# Patient Record
Sex: FEMALE | Race: WHITE | NOT HISPANIC OR LATINO | URBAN - NONMETROPOLITAN AREA
[De-identification: names, ages, dates, MRNs, and addresses within clinical notes are randomized per-mention and may not be internally consistent; named-entity substitution may affect disease eponyms.]

---

## 2017-01-20 ENCOUNTER — NEW PATIENT (OUTPATIENT)
Dept: URBAN - NONMETROPOLITAN AREA CLINIC 6 | Facility: CLINIC | Age: 42
End: 2017-01-20
Payer: COMMERCIAL

## 2017-01-20 DIAGNOSIS — H04.123 TEAR FILM INSUFFICIENCY OF BILATERAL LACRIMAL GLANDS: ICD-10-CM

## 2017-01-20 DIAGNOSIS — H52.01 HYPERMETROPIA, RIGHT EYE: Primary | ICD-10-CM

## 2017-01-20 PROCEDURE — 92015 DETERMINE REFRACTIVE STATE: CPT | Performed by: OPTOMETRIST

## 2017-01-20 PROCEDURE — 92004 COMPRE OPH EXAM NEW PT 1/>: CPT | Performed by: OPTOMETRIST

## 2017-01-20 ASSESSMENT — VISUAL ACUITY
OD: 20/20
OS: 20/20

## 2017-01-20 ASSESSMENT — INTRAOCULAR PRESSURE
OS: 16
OD: 17

## 2017-12-16 ENCOUNTER — CONVERSION ENCOUNTER (OUTPATIENT)
Dept: FAMILY MEDICINE CLINIC | Facility: CLINIC | Age: 42
End: 2017-12-16

## 2017-12-16 LAB
ALBUMIN SERPL-MCNC: 4.4 G/DL (ref 3.6–5.1)
ALBUMIN/GLOB SERPL: ABNORMAL {RATIO} (ref 1–2.5)
ALP SERPL-CCNC: 102 UNITS/L (ref 33–115)
ALT SERPL-CCNC: 20 UNITS/L (ref 6–29)
AST SERPL-CCNC: 16 UNITS/L (ref 10–30)
BASOPHILS # BLD AUTO: ABNORMAL 10*3/MM3 (ref 0–200)
BASOPHILS NFR BLD AUTO: 0.3 %
BILIRUB SERPL-MCNC: 0.4 MG/DL (ref 0.2–1.2)
BUN SERPL-MCNC: 12 MG/DL (ref 7–25)
BUN/CREAT SERPL: ABNORMAL (ref 6–22)
CALCIUM SERPL-MCNC: 9.7 MG/DL (ref 8.6–10.2)
CHLORIDE SERPL-SCNC: 108 MMOL/L (ref 98–110)
CHOLEST SERPL-MCNC: 188 MG/DL
CHOLEST/HDLC SERPL: ABNORMAL {RATIO}
CO2 CONTENT VENOUS: 25 MMOL/L (ref 20–31)
CONV NEUTROPHILS/100 LEUKOCYTES IN BODY FLUID BY MANUAL COUNT: 69 %
CONV TOTAL PROTEIN: 6.9 G/DL (ref 6.1–8.1)
CREAT UR-MCNC: 0.81 MG/DL (ref 0.5–1.1)
EOSINOPHIL # BLD AUTO: 1 %
EOSINOPHIL # BLD AUTO: ABNORMAL 10*3/MM3 (ref 15–500)
ERYTHROCYTE [DISTWIDTH] IN BLOOD BY AUTOMATED COUNT: 12.1 % (ref 11–15)
GLOBULIN UR ELPH-MCNC: ABNORMAL G/DL (ref 1.9–3.7)
GLUCOSE SERPL-MCNC: 87 MG/DL (ref 65–99)
HCT VFR BLD AUTO: 42.2 % (ref 35–45)
HDLC SERPL-MCNC: 36 MG/DL
HGB BLD-MCNC: 13.8 G/DL (ref 11.7–15.5)
LDLC SERPL CALC-MCNC: ABNORMAL MG/DL
LYMPHOCYTES # BLD AUTO: ABNORMAL 10*3/MM3 (ref 850–3900)
LYMPHOCYTES NFR BLD AUTO: 21.5 %
MCH RBC QN AUTO: 29.2 PG (ref 27–33)
MCHC RBC AUTO-ENTMCNC: ABNORMAL % (ref 32–36)
MCV RBC AUTO: 89.4 FL (ref 80–100)
MONOCYTES # BLD AUTO: ABNORMAL 10*3/MICROLITER (ref 200–950)
MONOCYTES NFR BLD AUTO: 8.2 %
NEUTROPHILS # BLD AUTO: ABNORMAL 10*3/MM3 (ref 1500–7800)
PLATELET # BLD AUTO: ABNORMAL 10*3/MM3 (ref 140–400)
PMV BLD AUTO: 11.4 FL (ref 7.5–12.5)
POTASSIUM SERPL-SCNC: 4.7 MMOL/L (ref 3.5–5.3)
RBC # BLD AUTO: ABNORMAL 10*6/MM3 (ref 3.8–5.1)
SODIUM SERPL-SCNC: 139 MMOL/L (ref 135–146)
T4 FREE SERPL-MCNC: 0.8 NG/DL (ref 0.8–1.8)
TRIGL SERPL-MCNC: 132 MG/DL
TSH SERPL-ACNC: 5.56 MICROINTL UNITS/ML
VIT B12 SERPL-MCNC: 1611 PG/ML (ref 200–1100)
WBC # BLD AUTO: ABNORMAL K/UL (ref 3.8–10.8)

## 2018-03-08 LAB
CONV NEUTROPHILS/100 LEUKOCYTES IN BODY FLUID BY MANUAL COUNT: 72 %
EOSINOPHIL # BLD AUTO: 1 %
EOSINOPHIL # BLD AUTO: ABNORMAL 10*3/MM3 (ref 15–500)
ERYTHROCYTE [DISTWIDTH] IN BLOOD BY AUTOMATED COUNT: 12.4 % (ref 11–15)
HCT VFR BLD AUTO: 41.1 % (ref 35–45)
HGB BLD-MCNC: 14 G/DL (ref 11.7–15.5)
LYMPHOCYTES # BLD AUTO: ABNORMAL 10*3/MM3 (ref 850–3900)
LYMPHOCYTES NFR BLD AUTO: 18 %
MCH RBC QN AUTO: 30.7 PG (ref 27–33)
MCHC RBC AUTO-ENTMCNC: ABNORMAL % (ref 32–36)
MCV RBC AUTO: 90.2 FL (ref 80–100)
MONOCYTES # BLD AUTO: ABNORMAL 10*3/MICROLITER (ref 200–950)
MONOCYTES NFR BLD AUTO: 8 %
NEUTROPHILS # BLD AUTO: ABNORMAL 10*3/MM3 (ref 1500–7800)
PLATELET # BLD AUTO: ABNORMAL 10*3/MM3 (ref 140–400)
PMV BLD AUTO: 10.1 FL (ref 7.5–11.5)
RBC # BLD AUTO: ABNORMAL 10*6/MM3 (ref 3.8–5.1)
TSH SERPL-ACNC: 0.09 MICROINTL UNITS/ML
WBC # BLD AUTO: ABNORMAL 10*3/MM3 (ref 3.8–10.8)

## 2018-08-24 LAB
ALBUMIN SERPL-MCNC: 4.4 G/DL (ref 3.6–5.1)
ALBUMIN/GLOB SERPL: ABNORMAL {RATIO} (ref 1–2.5)
ALP SERPL-CCNC: 93 UNITS/L (ref 33–115)
ALT SERPL-CCNC: 31 UNITS/L (ref 6–29)
AST SERPL-CCNC: 26 UNITS/L (ref 10–30)
BILIRUB SERPL-MCNC: 0.4 MG/DL (ref 0.2–1.2)
BUN SERPL-MCNC: 10 MG/DL (ref 7–25)
BUN/CREAT SERPL: ABNORMAL (ref 6–22)
CALCIUM SERPL-MCNC: 9.4 MG/DL (ref 8.6–10.2)
CHLORIDE SERPL-SCNC: 106 MMOL/L (ref 98–110)
CHOLEST SERPL-MCNC: 201 MG/DL
CHOLEST/HDLC SERPL: ABNORMAL {RATIO}
CO2 CONTENT VENOUS: 25 MMOL/L (ref 20–32)
CONV TOTAL PROTEIN: 7 G/DL (ref 6.1–8.1)
CREAT UR-MCNC: 0.77 MG/DL (ref 0.5–1.1)
GLOBULIN UR ELPH-MCNC: ABNORMAL G/DL (ref 1.9–3.7)
GLUCOSE SERPL-MCNC: 84 MG/DL (ref 65–139)
HDLC SERPL-MCNC: 31 MG/DL
LDLC SERPL CALC-MCNC: ABNORMAL MG/DL
POTASSIUM SERPL-SCNC: 4.5 MMOL/L (ref 3.5–5.3)
SODIUM SERPL-SCNC: 140 MMOL/L (ref 135–146)
TRIGL SERPL-MCNC: 459 MG/DL
TSH SERPL-ACNC: 1.09 MICROINTL UNITS/ML

## 2020-09-02 ENCOUNTER — NEW PATIENT (OUTPATIENT)
Dept: URBAN - NONMETROPOLITAN AREA CLINIC 6 | Facility: CLINIC | Age: 45
End: 2020-09-02
Payer: COMMERCIAL

## 2020-09-02 DIAGNOSIS — H52.13 MYOPIA, BILATERAL: Primary | ICD-10-CM

## 2020-09-02 PROCEDURE — 92015 DETERMINE REFRACTIVE STATE: CPT | Performed by: OPTOMETRIST

## 2020-09-02 PROCEDURE — 92004 COMPRE OPH EXAM NEW PT 1/>: CPT | Performed by: OPTOMETRIST

## 2020-09-02 ASSESSMENT — VISUAL ACUITY
OD: 20/20
OS: 20/20

## 2020-09-02 ASSESSMENT — INTRAOCULAR PRESSURE
OD: 16
OS: 16

## 2022-08-02 ENCOUNTER — OFFICE VISIT (OUTPATIENT)
Dept: ENDOCRINOLOGY | Facility: CLINIC | Age: 47
End: 2022-08-02

## 2022-08-02 VITALS
SYSTOLIC BLOOD PRESSURE: 122 MMHG | HEART RATE: 89 BPM | OXYGEN SATURATION: 98 % | HEIGHT: 63 IN | BODY MASS INDEX: 41.11 KG/M2 | WEIGHT: 232 LBS | DIASTOLIC BLOOD PRESSURE: 78 MMHG

## 2022-08-02 DIAGNOSIS — E78.2 MIXED HYPERLIPIDEMIA: ICD-10-CM

## 2022-08-02 DIAGNOSIS — E06.3 HYPOTHYROIDISM DUE TO HASHIMOTO'S THYROIDITIS: ICD-10-CM

## 2022-08-02 DIAGNOSIS — E55.9 VITAMIN D DEFICIENCY: ICD-10-CM

## 2022-08-02 DIAGNOSIS — E03.8 HYPOTHYROIDISM DUE TO HASHIMOTO'S THYROIDITIS: ICD-10-CM

## 2022-08-02 DIAGNOSIS — E05.80 HYPERTHYROIDISM WITH HASHIMOTO DISEASE: Primary | ICD-10-CM

## 2022-08-02 DIAGNOSIS — E06.3 HYPERTHYROIDISM WITH HASHIMOTO DISEASE: Primary | ICD-10-CM

## 2022-08-02 PROBLEM — K21.00 GASTRO-ESOPHAGEAL REFLUX DISEASE WITH ESOPHAGITIS: Status: ACTIVE | Noted: 2021-12-13

## 2022-08-02 PROBLEM — M19.90 ARTHRITIS: Status: ACTIVE | Noted: 2022-08-02

## 2022-08-02 PROBLEM — J30.2 SEASONAL ALLERGIES: Status: ACTIVE | Noted: 2017-12-15

## 2022-08-02 PROBLEM — R51.9 ACUTE HEADACHE: Status: ACTIVE | Noted: 2021-12-22

## 2022-08-02 PROBLEM — F41.9 ANXIETY DISORDER: Status: ACTIVE | Noted: 2019-05-29

## 2022-08-02 PROBLEM — K58.9 IRRITABLE BOWEL SYNDROME: Status: ACTIVE | Noted: 2022-08-02

## 2022-08-02 PROBLEM — E78.5 HYPERLIPIDEMIA: Status: ACTIVE | Noted: 2022-08-02

## 2022-08-02 PROBLEM — R35.0 INCREASED FREQUENCY OF URINATION: Status: ACTIVE | Noted: 2018-03-08

## 2022-08-02 PROBLEM — E53.8 DEFICIENCY OF OTHER SPECIFIED B GROUP VITAMINS: Status: ACTIVE | Noted: 2022-08-02

## 2022-08-02 PROBLEM — U07.1 COVID-19: Status: ACTIVE | Noted: 2021-12-22

## 2022-08-02 PROBLEM — K58.9 IRRITABLE BOWEL SYNDROME: Status: ACTIVE | Noted: 2019-05-29

## 2022-08-02 PROBLEM — N95.1 MENOPAUSAL SYMPTOM: Status: ACTIVE | Noted: 2019-10-02

## 2022-08-02 PROBLEM — E66.9 OBESITY: Status: ACTIVE | Noted: 2019-05-29

## 2022-08-02 PROBLEM — J01.00 SINUSITIS, ACUTE MAXILLARY: Status: ACTIVE | Noted: 2017-12-15

## 2022-08-02 PROBLEM — J35.1 TONSILLAR ENLARGEMENT: Status: ACTIVE | Noted: 2018-03-08

## 2022-08-02 PROBLEM — B34.9 VIRAL DISEASE: Status: ACTIVE | Noted: 2021-12-22

## 2022-08-02 PROBLEM — J45.909 ASTHMA: Status: ACTIVE | Noted: 2022-08-02

## 2022-08-02 PROBLEM — F32.A DEPRESSION: Status: ACTIVE | Noted: 2022-08-02

## 2022-08-02 PROBLEM — F52.0 LACK OR LOSS OF SEXUAL DESIRE: Status: ACTIVE | Noted: 2019-09-11

## 2022-08-02 PROCEDURE — 99244 OFF/OP CNSLTJ NEW/EST MOD 40: CPT | Performed by: INTERNAL MEDICINE

## 2022-08-02 RX ORDER — DIPHENOXYLATE HYDROCHLORIDE AND ATROPINE SULFATE 2.5; .025 MG/1; MG/1
TABLET ORAL
COMMUNITY
End: 2022-08-02

## 2022-08-02 RX ORDER — VITAMIN E 268 MG
CAPSULE ORAL EVERY 12 HOURS SCHEDULED
COMMUNITY

## 2022-08-02 RX ORDER — ASPIRIN 81 MG/1
TABLET ORAL
COMMUNITY
End: 2022-08-02

## 2022-08-02 RX ORDER — BREMELANOTIDE 1.75 MG/.3ML
1.75 INJECTION SUBCUTANEOUS EVERY 24 HOURS
COMMUNITY
End: 2022-08-02

## 2022-08-02 RX ORDER — AMITRIPTYLINE HYDROCHLORIDE 10 MG/1
10 TABLET, FILM COATED ORAL
COMMUNITY
Start: 2022-05-28

## 2022-08-02 RX ORDER — PANTOPRAZOLE SODIUM 40 MG/1
40 TABLET, DELAYED RELEASE ORAL 2 TIMES DAILY
COMMUNITY
Start: 2022-07-09

## 2022-08-02 RX ORDER — ASPIRIN 81 MG/1
81 TABLET ORAL DAILY
Qty: 150 TABLET | Refills: 2 | Status: SHIPPED | OUTPATIENT
Start: 2022-08-02 | End: 2023-08-02

## 2022-08-02 RX ORDER — ALBUTEROL SULFATE 90 UG/1
2 AEROSOL, METERED RESPIRATORY (INHALATION) EVERY 4 HOURS PRN
COMMUNITY

## 2022-08-02 RX ORDER — LEVOCETIRIZINE DIHYDROCHLORIDE 5 MG/1
TABLET, FILM COATED ORAL
COMMUNITY

## 2022-08-02 RX ORDER — GABAPENTIN 100 MG/1
CAPSULE ORAL EVERY 8 HOURS SCHEDULED
COMMUNITY
End: 2022-08-02

## 2022-08-02 RX ORDER — SUMATRIPTAN 100 MG/1
TABLET, FILM COATED ORAL
COMMUNITY
End: 2022-08-02

## 2022-08-02 RX ORDER — LEVOTHYROXINE SODIUM 137 UG/1
68.5 TABLET ORAL DAILY
COMMUNITY
Start: 2022-06-06 | End: 2022-08-31 | Stop reason: SDUPTHER

## 2022-08-02 RX ORDER — BEPOTASTINE BESILATE 15 MG/ML
SOLUTION/ DROPS OPHTHALMIC EVERY 12 HOURS SCHEDULED
COMMUNITY

## 2022-08-02 RX ORDER — VORTIOXETINE 10 MG/1
1 TABLET, FILM COATED ORAL DAILY
COMMUNITY
Start: 2022-07-18

## 2022-08-02 RX ORDER — FENOFIBRATE 160 MG/1
160 TABLET ORAL DAILY
COMMUNITY
Start: 2022-05-28

## 2022-08-02 RX ORDER — FLUTICASONE PROPIONATE 50 MCG
SPRAY, SUSPENSION (ML) NASAL
COMMUNITY

## 2022-08-02 RX ORDER — ESCITALOPRAM OXALATE 10 MG/1
TABLET ORAL
COMMUNITY
End: 2022-08-02

## 2022-08-02 RX ORDER — METHOCARBAMOL 500 MG/1
TABLET, FILM COATED ORAL
COMMUNITY
Start: 2022-07-09 | End: 2022-08-02

## 2022-08-02 RX ORDER — FEXOFENADINE HCL AND PSEUDOEPHEDRINE HCI 180; 240 MG/1; MG/1
TABLET, EXTENDED RELEASE ORAL
COMMUNITY

## 2022-08-02 RX ORDER — RIMEGEPANT SULFATE 75 MG/75MG
TABLET, ORALLY DISINTEGRATING ORAL
COMMUNITY
Start: 2022-07-20

## 2022-08-02 RX ORDER — SUCRALFATE 1 G/1
1 TABLET ORAL 4 TIMES DAILY
COMMUNITY
Start: 2022-05-31

## 2022-08-02 RX ORDER — DULOXETIN HYDROCHLORIDE 30 MG/1
30 CAPSULE, DELAYED RELEASE ORAL 2 TIMES DAILY
COMMUNITY
Start: 2022-07-18

## 2022-08-02 RX ORDER — BUPROPION HYDROCHLORIDE 150 MG/1
TABLET ORAL EVERY 24 HOURS
COMMUNITY
End: 2022-08-02

## 2022-08-02 RX ORDER — ACETAMINOPHEN 160 MG
2000 TABLET,DISINTEGRATING ORAL DAILY
COMMUNITY
Start: 2022-06-06

## 2022-08-02 RX ORDER — HYDROCHLOROTHIAZIDE 25 MG/1
25 TABLET ORAL DAILY
COMMUNITY
Start: 2022-07-22

## 2022-08-02 NOTE — PROGRESS NOTES
Endocrine Consult Outpatient    Referred by Rhoda Villa NP for Hypothyroisdim consult  Patient Care Team:  Rhoda Villa as PCP - General (Nurse Practitioner)     Chief Complaint: Hypothyroidism      HPI: This is a 46-year-old female with history of hypothyroidism for last 10 years, history of hypertriglyceridemia is referred here because of thyroid issues.    For hypothyroidism: Initial diagnosis was about 10 years ago, she does have Hashimoto's thyroiditis, she has been having fluctuating thyroid levels.  Currently taking half of 137 mcg daily for last 1 year.  She is currently taking her thyroid by itself with water at least 1 hour before or after other pills and food.  She does admit fatigue and tiredness.  She does have dry skin, hair loss and constipation.  1 weight has been fluctuating.    Hyperlipidemia: She does have high triglycerides and currently takes fenofibrate.    Diet discussed with her: 4 drinks he usually use water all the time  For breakfast she usually either have protein bar or a muffin sausage or cheese some scrambled eggs or over any easy aches.  For lunch she usually have salads may be some chicken and for dinner she usually has a menu and cooks at home.  For snacking she may have peanuts, beef jerky, fruits or veggies.    He does have sleep apnea and is scheduled to see Dr. White.    Past Medical History:   Diagnosis Date   • Hormone disorder    • Hypothyroid        Social History     Socioeconomic History   • Marital status:      Spouse name: Lincoln   • Number of children: 3   • Years of education: 11   Tobacco Use   • Smoking status: Never Smoker   • Smokeless tobacco: Never Used   Vaping Use   • Vaping Use: Never used   Substance and Sexual Activity   • Alcohol use: Yes     Comment: rare   • Drug use: Never       Family History   Problem Relation Age of Onset   • Thyroid disease Father    • Hypertension Father    • Diabetes Father    • Heart disease Father    • Stroke  Father    • Hyperlipidemia Father    • Cancer Sister         uteral   • Cancer Brother    • Heart disease Brother    • Hyperlipidemia Brother    • Liver disease Brother        Allergies   Allergen Reactions   • Contrast Dye Unknown - Low Severity   • Latex Unknown - Low Severity   • Tramadol Unknown - Low Severity   • Tuberculin Purified Protein Derivative Unknown - Low Severity   • Morphine Unknown - Low Severity       ROS:   Constitutional:  Admit fatigue, tiredness.    Eyes:  Denies change in visual acuity   HENT:  Denies nasal congestion or sore throat   Respiratory: denies cough, shortness of breath.   Cardiovascular:  denies chest pain, edema   GI:  Denies abdominal pain, nausea, vomiting.    :  Denies dysuria   Musculoskeletal:  Denies back pain or joint pain   Integument:  Denies dry skin, rash   Neurologic:  Denies headache, focal weakness or sensory changes   Endocrine:  Denies polyuria or polydipsia   Psychiatric:  Admit Depression and Anxiety      Vitals:    08/02/22 1306   BP: 122/78   Pulse: 89   SpO2: 98%     Body mass index is 41.1 kg/m².      Physical Exam:  GEN: NAD, conversant, obese  EYES: EOMI, PERRL, no conjunctival erythema  NECK: no thyromegaly, full ROM   CV: RRR, no murmurs/rubs/gallops, no peripheral edema  LUNG: CTAB, no wheezes/rales/ronchi  SKIN: no rashes, no acanthosis  MSK: no deformities, full ROM of all extremities  NEURO: no tremors, DTR normal  PSYCH: AOX3, appropriate mood, affect normal      Results Review:     I reviewed the patient's new clinical results.      Labs from 12/29/2021: Vit D 28, TSH 13.12, vitamin B12 389, folic acid was 10.8  White count 8.4, hemoglobin 14.3, medic at 42.3, platelet count was 345  Serum glucose 96, BUN 10, creatinine 0.7, sodium 139, potassium 4.4, chloride 106, CO2 20, AST 34, ALT 35, magnesium 2.2 TPO antibodies were 39  Total cholesterol 199, HDL 34, triglycerides 305 and .    Medication Review: Reviewed.       Current Outpatient  Medications:   •  albuterol sulfate  (90 Base) MCG/ACT inhaler, Inhale 2 puffs Every 4 (Four) Hours As Needed for Wheezing., Disp: , Rfl:   •  amitriptyline (ELAVIL) 10 MG tablet, Take 10 mg by mouth every night at bedtime., Disp: , Rfl:   •  aspirin 81 MG EC tablet, aspirin 81 mg tablet,delayed release   1 tab by oral route., Disp: , Rfl:   •  Bepotastine Besilate 1.5 % solution, Every 12 (Twelve) Hours., Disp: , Rfl:   •  Cholecalciferol (Vitamin D3) 50 MCG (2000 UT) capsule, Take 2,000 Units by mouth Daily., Disp: , Rfl:   •  DULoxetine (CYMBALTA) 30 MG capsule, TAKE 1 CAPSULE BY MOUTH ONCE DAILY FOR 7 DAYS THEN 2 ONCE DAILY THEREAFTER, Disp: , Rfl:   •  fenofibrate 160 MG tablet, Take 160 mg by mouth Daily., Disp: , Rfl:   •  fexofenadine-pseudoephedrine (ALLEGRA-D 24) 180-240 MG per 24 hr tablet, Allegra-D 24 Hour 180 mg-240 mg tablet,extended release   1 tab by oral route., Disp: , Rfl:   •  FIBER PO, Take  by mouth., Disp: , Rfl:   •  fluticasone (FLONASE) 50 MCG/ACT nasal spray, Flonase Allergy Relief 50 mcg/actuation nasal spray,suspension   1 spray by nasal route., Disp: , Rfl:   •  Fluticasone-Umeclidin-Vilant (Trelegy Ellipta) 100-62.5-25 MCG/INH inhaler, Inhale 1 puff Daily., Disp: , Rfl:   •  hydroCHLOROthiazide (HYDRODIURIL) 25 MG tablet, Take 25 mg by mouth Daily., Disp: , Rfl:   •  levocetirizine (XYZAL) 5 MG tablet, Xyzal 5 mg tablet   1 tab by oral route., Disp: , Rfl:   •  levothyroxine (SYNTHROID, LEVOTHROID) 137 MCG tablet, Take 68.5 mcg by mouth Daily., Disp: , Rfl:   •  Misc Natural Products (ELDERBERRY ZINC/VIT C/IMMUNE MT), Apply  to the mouth or throat., Disp: , Rfl:   •  Nurtec 75 MG dispersible tablet, DISSOLVE 1 TABLET BY MOUTH EVERY OTHER DAY FOR MIGRAINE PROPHYLAXIS, Disp: , Rfl:   •  pantoprazole (PROTONIX) 40 MG EC tablet, Take 40 mg by mouth 2 (Two) Times a Day., Disp: , Rfl:   •  sucralfate (CARAFATE) 1 g tablet, Take 1 g by mouth 4 (Four) Times a Day., Disp: , Rfl:   •   Trintellix 10 MG tablet, Take 1 tablet by mouth Daily., Disp: , Rfl:   •  vitamin E 400 UNIT capsule, Every 12 (Twelve) Hours., Disp: , Rfl:         Assessment and plan:  Hypothyroidism due to Hashimoto's thyroiditis: Uncontrolled, will check TSH and free T4.  For now she will contain half a tablet of 137 mcg p.o. daily.  She is advised to take it by itself with water at least 1 hour before or after other pills and food.  We will make further recommendations after the labs are available.    Hyperlipidemia: Currently on fenofibrate check fasting lipid    Vitamin D deficiency: Currently taking vitamin D supplementation.    Kayla Poe MD FACE.

## 2022-08-02 NOTE — PROGRESS NOTES
Endocrine Consult Outpatient    Referred by Rhoda Villa NP for Hypothyroisdim consult  Patient Care Team:  Rhoda Villa as PCP - General (Nurse Practitioner)     Chief Complaint: Hypothyroidism      HPI: This is a 46-year-old female with history of hypothyroidism for last 10 years, history of hypertriglyceridemia is referred here because of thyroid issues.    For hypothyroidism: Initial diagnosis was about 10 years ago, she does have Hashimoto's thyroiditis, she has been having fluctuating thyroid levels.  Currently taking half of 137 mcg daily for last 1 year.  She is currently taking her thyroid by itself with water at least 1 hour before or after other pills and food.  She does admit fatigue and tiredness.  She does have dry skin, hair loss and constipation.  1 weight has been fluctuating.    Hyperlipidemia: She does have high triglycerides and currently takes fenofibrate.    Diet discussed with her: 4 drinks he usually use water all the time  For breakfast she usually either have protein bar or a muffin sausage or cheese some scrambled eggs or over any easy aches.  For lunch she usually have salads may be some chicken and for dinner she usually has a menu and cooks at home.  For snacking she may have peanuts, beef jerky, fruits or veggies.    He does have sleep apnea and is scheduled to see Dr. White.    Past Medical History:   Diagnosis Date   • Hormone disorder    • Hypothyroid        Social History     Socioeconomic History   • Marital status:      Spouse name: Lincoln   • Number of children: 3   • Years of education: 11   Tobacco Use   • Smoking status: Never Smoker   • Smokeless tobacco: Never Used   Vaping Use   • Vaping Use: Never used   Substance and Sexual Activity   • Alcohol use: Yes     Comment: rare   • Drug use: Never       Family History   Problem Relation Age of Onset   • Thyroid disease Father    • Hypertension Father    • Diabetes Father    • Heart disease Father    • Stroke  Father    • Hyperlipidemia Father    • Cancer Sister         uteral   • Cancer Brother    • Heart disease Brother    • Hyperlipidemia Brother    • Liver disease Brother        Allergies   Allergen Reactions   • Contrast Dye Unknown - Low Severity   • Latex Unknown - Low Severity   • Tramadol Unknown - Low Severity   • Tuberculin Purified Protein Derivative Unknown - Low Severity   • Morphine Unknown - Low Severity       ROS:   Constitutional:  Admit fatigue, tiredness.    Eyes:  Denies change in visual acuity   HENT:  Denies nasal congestion or sore throat   Respiratory: denies cough, shortness of breath.   Cardiovascular:  denies chest pain, edema   GI:  Denies abdominal pain, nausea, vomiting.    :  Denies dysuria   Musculoskeletal:  Denies back pain or joint pain   Integument:  Denies dry skin, rash   Neurologic:  Denies headache, focal weakness or sensory changes   Endocrine:  Denies polyuria or polydipsia   Psychiatric:  Admit Depression and Anxiety      Vitals:    08/02/22 1306   BP: 122/78   Pulse: 89   SpO2: 98%     Body mass index is 41.1 kg/m².      Physical Exam:  GEN: NAD, conversant, obese  EYES: EOMI, PERRL, no conjunctival erythema  NECK: no thyromegaly, full ROM   CV: RRR, no murmurs/rubs/gallops, no peripheral edema  LUNG: CTAB, no wheezes/rales/ronchi  SKIN: no rashes, no acanthosis  MSK: no deformities, full ROM of all extremities  NEURO: no tremors, DTR normal  PSYCH: AOX3, appropriate mood, affect normal      Results Review:     I reviewed the patient's new clinical results.      Labs from 12/29/2021: Vit D 28, TSH 13.12, vitamin B12 389, folic acid was 10.8  White count 8.4, hemoglobin 14.3, medic at 42.3, platelet count was 345  Serum glucose 96, BUN 10, creatinine 0.7, sodium 139, potassium 4.4, chloride 106, CO2 20, AST 34, ALT 35, magnesium 2.2 TPO antibodies were 39  Total cholesterol 199, HDL 34, triglycerides 305 and .    Medication Review: Reviewed.       Current Outpatient  Medications:   •  albuterol sulfate  (90 Base) MCG/ACT inhaler, Inhale 2 puffs Every 4 (Four) Hours As Needed for Wheezing., Disp: , Rfl:   •  amitriptyline (ELAVIL) 10 MG tablet, Take 10 mg by mouth every night at bedtime., Disp: , Rfl:   •  aspirin 81 MG EC tablet, aspirin 81 mg tablet,delayed release   1 {tab} by oral route., Disp: , Rfl:   •  Bepotastine Besilate 1.5 % solution, Every 12 (Twelve) Hours., Disp: , Rfl:   •  Cholecalciferol (Vitamin D3) 50 MCG (2000 UT) capsule, Take 2,000 Units by mouth Daily., Disp: , Rfl:   •  DULoxetine (CYMBALTA) 30 MG capsule, TAKE 1 CAPSULE BY MOUTH ONCE DAILY FOR 7 DAYS THEN 2 ONCE DAILY THEREAFTER, Disp: , Rfl:   •  fenofibrate 160 MG tablet, Take 160 mg by mouth Daily., Disp: , Rfl:   •  fexofenadine-pseudoephedrine (ALLEGRA-D 24) 180-240 MG per 24 hr tablet, Allegra-D 24 Hour 180 mg-240 mg tablet,extended release   1 {tab} by oral route., Disp: , Rfl:   •  FIBER PO, Take  by mouth., Disp: , Rfl:   •  fluticasone (FLONASE) 50 MCG/ACT nasal spray, Flonase Allergy Relief 50 mcg/actuation nasal spray,suspension   1 {spray} by nasal route., Disp: , Rfl:   •  Fluticasone-Umeclidin-Vilant (Trelegy Ellipta) 100-62.5-25 MCG/INH inhaler, Inhale 1 puff Daily., Disp: , Rfl:   •  hydroCHLOROthiazide (HYDRODIURIL) 25 MG tablet, Take 25 mg by mouth Daily., Disp: , Rfl:   •  levocetirizine (XYZAL) 5 MG tablet, Xyzal 5 mg tablet   1 {tab} by oral route., Disp: , Rfl:   •  levothyroxine (SYNTHROID, LEVOTHROID) 137 MCG tablet, Take 68.5 mcg by mouth Daily., Disp: , Rfl:   •  Misc Natural Products (ELDERBERRY ZINC/VIT C/IMMUNE MT), Apply  to the mouth or throat., Disp: , Rfl:   •  Nurtec 75 MG dispersible tablet, DISSOLVE 1 TABLET BY MOUTH EVERY OTHER DAY FOR MIGRAINE PROPHYLAXIS, Disp: , Rfl:   •  pantoprazole (PROTONIX) 40 MG EC tablet, Take 40 mg by mouth 2 (Two) Times a Day., Disp: , Rfl:   •  sucralfate (CARAFATE) 1 g tablet, Take 1 g by mouth 4 (Four) Times a Day., Disp: , Rfl:    •  Trintellix 10 MG tablet, Take 1 tablet by mouth Daily., Disp: , Rfl:   •  vitamin E 400 UNIT capsule, Every 12 (Twelve) Hours., Disp: , Rfl:         Assessment and plan:  Hypothyroidism due to Hashimoto's thyroiditis: Uncontrolled, will check TSH and free T4.  For now she will contain half a tablet of 137 mcg p.o. daily.  She is advised to take it by itself with water at least 1 hour before or after other pills and food.  We will make further recommendations after the labs are available.    Hyperlipidemia: Currently on fenofibrate check fasting lipid    Vitamin D deficiency: Currently taking vitamin D supplementation.    Kayla Poe MD FACE.

## 2022-08-02 NOTE — PATIENT INSTRUCTIONS
Please continue levothyroxine 137 mcg tablets and take half a tablet once a day.  Please make sure you take your thyroid medicine by itself with water at least 1 hour before or after the pills and food.  Check TSH with free T4 along with CMP, lipid panel and A1c in the next few days.

## 2022-08-04 ENCOUNTER — PATIENT ROUNDING (BHMG ONLY) (OUTPATIENT)
Dept: ENDOCRINOLOGY | Facility: CLINIC | Age: 47
End: 2022-08-04

## 2022-08-04 NOTE — PROGRESS NOTES
August 4, 2022    Hello, may I speak with Christie Patel?    My name is Massiel     I am  with [facility]  [address] [facility] [place] [address].    Before we get started may I verify your date of birth? 1975    I am calling to officially welcome you to our practice and ask about your recent visit. Is this a good time to talk? yes    Tell me about your visit with us. What things went well? Everything went well,. The nurse was very thorough and is the doctor. Great Experience.       We're always looking for ways to make our patients' experiences even better. Do you have recommendations on ways we may improve?  No    Overall were you satisfied with your first visit to our practice? Yes     I appreciate you taking the time to speak with me today. Is there anything else I can do for you? No    Thank you, and have a great day.

## 2022-08-30 DIAGNOSIS — E03.8 HYPOTHYROIDISM DUE TO HASHIMOTO'S THYROIDITIS: Primary | ICD-10-CM

## 2022-08-30 DIAGNOSIS — E06.3 HYPOTHYROIDISM DUE TO HASHIMOTO'S THYROIDITIS: Primary | ICD-10-CM

## 2022-08-31 RX ORDER — LEVOTHYROXINE SODIUM 0.15 MG/1
150 TABLET ORAL DAILY
Qty: 30 TABLET | Refills: 5 | Status: SHIPPED | OUTPATIENT
Start: 2022-08-31 | End: 2022-09-02 | Stop reason: SDUPTHER

## 2022-09-02 DIAGNOSIS — E06.3 HYPOTHYROIDISM DUE TO HASHIMOTO'S THYROIDITIS: Primary | ICD-10-CM

## 2022-09-02 DIAGNOSIS — E03.8 HYPOTHYROIDISM DUE TO HASHIMOTO'S THYROIDITIS: Primary | ICD-10-CM

## 2022-09-02 RX ORDER — LEVOTHYROXINE SODIUM 0.15 MG/1
150 TABLET ORAL DAILY
Qty: 30 TABLET | Refills: 5 | Status: SHIPPED | OUTPATIENT
Start: 2022-09-02 | End: 2022-10-05

## 2022-10-04 ENCOUNTER — TELEPHONE (OUTPATIENT)
Dept: ENDOCRINOLOGY | Facility: CLINIC | Age: 47
End: 2022-10-04

## 2022-10-04 DIAGNOSIS — E03.9 HYPOTHYROIDISM, UNSPECIFIED TYPE: Primary | ICD-10-CM

## 2022-10-04 NOTE — TELEPHONE ENCOUNTER
You changed her Levothyroxine about 3 weeks ago from the labs and she has gained 10 lbs and her irritability has ramirez rocketed to level 10. Please advise

## 2022-10-05 NOTE — TELEPHONE ENCOUNTER
Patient had LM she was returning our call. I called her back and got her VM. I left this information on her VM with instructions to call us back with where to send Rx to.

## 2022-10-05 NOTE — TELEPHONE ENCOUNTER
Kayla Poe MD  to Gerri Vasquez MA          10/4/22 3:30 PM   Change her levothyroxine to 75 mcg p.o. daily.  Check TSH and free T4 now.

## 2022-10-05 NOTE — TELEPHONE ENCOUNTER
Rona pt. Lab orders entered & lab appt scheduled for Friday 10/7. Pt would like rx sent to [facility] pharmacy in [place]. Rx sent / pending signature.

## 2022-10-06 RX ORDER — LEVOTHYROXINE SODIUM 0.07 MG/1
75 TABLET ORAL DAILY
Qty: 30 TABLET | Refills: 3 | Status: SHIPPED | OUTPATIENT
Start: 2022-10-06 | End: 2023-01-09 | Stop reason: SDUPTHER

## 2022-10-07 ENCOUNTER — LAB (OUTPATIENT)
Dept: LAB | Facility: HOSPITAL | Age: 47
End: 2022-10-07

## 2022-10-07 DIAGNOSIS — E03.9 HYPOTHYROIDISM, UNSPECIFIED TYPE: ICD-10-CM

## 2022-10-07 DIAGNOSIS — E03.8 HYPOTHYROIDISM DUE TO HASHIMOTO'S THYROIDITIS: ICD-10-CM

## 2022-10-07 DIAGNOSIS — E06.3 HYPOTHYROIDISM DUE TO HASHIMOTO'S THYROIDITIS: ICD-10-CM

## 2022-10-07 LAB
T4 FREE SERPL-MCNC: 1.25 NG/DL (ref 0.93–1.7)
TSH SERPL DL<=0.05 MIU/L-ACNC: 2.41 UIU/ML (ref 0.27–4.2)

## 2022-10-07 PROCEDURE — 84439 ASSAY OF FREE THYROXINE: CPT

## 2022-10-07 PROCEDURE — 84443 ASSAY THYROID STIM HORMONE: CPT

## 2022-10-07 PROCEDURE — 36415 COLL VENOUS BLD VENIPUNCTURE: CPT

## 2023-01-09 RX ORDER — LEVOTHYROXINE SODIUM 0.07 MG/1
75 TABLET ORAL DAILY
Qty: 30 TABLET | Refills: 3 | Status: SHIPPED | OUTPATIENT
Start: 2023-01-09 | End: 2023-03-22

## 2023-03-08 RX ORDER — CLOBETASOL PROPIONATE 0.5 MG/G
OINTMENT TOPICAL
COMMUNITY
Start: 2023-02-28

## 2023-03-08 RX ORDER — NITROFURANTOIN 25; 75 MG/1; MG/1
1 CAPSULE ORAL EVERY 12 HOURS SCHEDULED
COMMUNITY
Start: 2023-03-06 | End: 2023-03-17

## 2023-03-08 RX ORDER — MECLIZINE HYDROCHLORIDE 25 MG/1
25 TABLET ORAL EVERY 8 HOURS PRN
COMMUNITY
Start: 2022-12-12

## 2023-03-08 RX ORDER — AMOXICILLIN 875 MG/1
875 TABLET, COATED ORAL 2 TIMES DAILY
COMMUNITY
Start: 2022-12-08 | End: 2023-03-17

## 2023-03-08 RX ORDER — METOCLOPRAMIDE 10 MG/1
1 TABLET ORAL EVERY 6 HOURS
COMMUNITY
Start: 2023-02-27

## 2023-03-08 RX ORDER — GLYCOPYRROLATE 2 MG/1
1 TABLET ORAL 3 TIMES DAILY
COMMUNITY
Start: 2023-02-22

## 2023-03-08 RX ORDER — ONDANSETRON 4 MG/1
4 TABLET, ORALLY DISINTEGRATING ORAL EVERY 8 HOURS PRN
COMMUNITY
Start: 2022-12-12

## 2023-03-17 ENCOUNTER — OFFICE VISIT (OUTPATIENT)
Dept: ENDOCRINOLOGY | Facility: CLINIC | Age: 48
End: 2023-03-17
Payer: COMMERCIAL

## 2023-03-17 ENCOUNTER — LAB (OUTPATIENT)
Dept: LAB | Facility: HOSPITAL | Age: 48
End: 2023-03-17
Payer: COMMERCIAL

## 2023-03-17 VITALS
DIASTOLIC BLOOD PRESSURE: 80 MMHG | HEIGHT: 63 IN | OXYGEN SATURATION: 97 % | WEIGHT: 245 LBS | SYSTOLIC BLOOD PRESSURE: 127 MMHG | HEART RATE: 99 BPM | BODY MASS INDEX: 43.41 KG/M2

## 2023-03-17 DIAGNOSIS — E03.8 HYPOTHYROIDISM DUE TO HASHIMOTO'S THYROIDITIS: Primary | ICD-10-CM

## 2023-03-17 DIAGNOSIS — E06.3 HYPOTHYROIDISM DUE TO HASHIMOTO'S THYROIDITIS: Primary | ICD-10-CM

## 2023-03-17 DIAGNOSIS — E55.9 VITAMIN D DEFICIENCY: ICD-10-CM

## 2023-03-17 DIAGNOSIS — E03.8 HYPOTHYROIDISM DUE TO HASHIMOTO'S THYROIDITIS: ICD-10-CM

## 2023-03-17 DIAGNOSIS — E78.1 HYPERTRIGLYCERIDEMIA: ICD-10-CM

## 2023-03-17 DIAGNOSIS — E06.3 HYPOTHYROIDISM DUE TO HASHIMOTO'S THYROIDITIS: ICD-10-CM

## 2023-03-17 LAB
25(OH)D3 SERPL-MCNC: 32.6 NG/ML (ref 30–100)
ALBUMIN SERPL-MCNC: 4.5 G/DL (ref 3.5–5.2)
ALBUMIN/GLOB SERPL: 1.6 G/DL
ALP SERPL-CCNC: 73 U/L (ref 39–117)
ALT SERPL W P-5'-P-CCNC: 43 U/L (ref 1–33)
ANION GAP SERPL CALCULATED.3IONS-SCNC: 12.8 MMOL/L (ref 5–15)
AST SERPL-CCNC: 29 U/L (ref 1–32)
BILIRUB SERPL-MCNC: 0.3 MG/DL (ref 0–1.2)
BUN SERPL-MCNC: 15 MG/DL (ref 6–20)
BUN/CREAT SERPL: 17.2 (ref 7–25)
CALCIUM SPEC-SCNC: 9.9 MG/DL (ref 8.6–10.5)
CHLORIDE SERPL-SCNC: 105 MMOL/L (ref 98–107)
CHOLEST SERPL-MCNC: 165 MG/DL (ref 0–200)
CO2 SERPL-SCNC: 21.2 MMOL/L (ref 22–29)
CREAT SERPL-MCNC: 0.87 MG/DL (ref 0.57–1)
EGFRCR SERPLBLD CKD-EPI 2021: 83.3 ML/MIN/1.73
GLOBULIN UR ELPH-MCNC: 2.9 GM/DL
GLUCOSE SERPL-MCNC: 95 MG/DL (ref 65–99)
HDLC SERPL-MCNC: 36 MG/DL (ref 40–60)
LDLC SERPL CALC-MCNC: 100 MG/DL (ref 0–100)
LDLC/HDLC SERPL: 2.66 {RATIO}
POTASSIUM SERPL-SCNC: 4.2 MMOL/L (ref 3.5–5.2)
PROT SERPL-MCNC: 7.4 G/DL (ref 6–8.5)
SODIUM SERPL-SCNC: 139 MMOL/L (ref 136–145)
T4 FREE SERPL-MCNC: 1.13 NG/DL (ref 0.93–1.7)
TRIGL SERPL-MCNC: 166 MG/DL (ref 0–150)
TSH SERPL DL<=0.05 MIU/L-ACNC: 4.62 UIU/ML (ref 0.27–4.2)
VLDLC SERPL-MCNC: 29 MG/DL (ref 5–40)

## 2023-03-17 PROCEDURE — 36415 COLL VENOUS BLD VENIPUNCTURE: CPT

## 2023-03-17 PROCEDURE — 82306 VITAMIN D 25 HYDROXY: CPT

## 2023-03-17 PROCEDURE — 84439 ASSAY OF FREE THYROXINE: CPT

## 2023-03-17 PROCEDURE — 80061 LIPID PANEL: CPT

## 2023-03-17 PROCEDURE — 99214 OFFICE O/P EST MOD 30 MIN: CPT | Performed by: INTERNAL MEDICINE

## 2023-03-17 PROCEDURE — 84443 ASSAY THYROID STIM HORMONE: CPT

## 2023-03-17 PROCEDURE — 80053 COMPREHEN METABOLIC PANEL: CPT

## 2023-03-17 NOTE — PROGRESS NOTES
Endocrine Progress Note Outpatient     Patient Care Team:  Rhoda Villa as PCP - General (Nurse Practitioner)    Chief Complaint: Follow-up hypothyroidism      HPI: This is a 47-year-old female with history of hypothyroidism, vitamin D deficiency and hypertriglyceridemia is here for follow-up.    For hypothyroidism: She is currently on levothyroxine 75 mcg p.o. daily.  She is taking it on regular basis.    Hypertriglyceridemia: She is on fenofibrate.    Vitamin D deficiency: She is on vitamin D supplementation.    Past Medical History:   Diagnosis Date   • Diverticulitis    • Hormone disorder    • Hypothyroid        Social History     Socioeconomic History   • Marital status:      Spouse name: Lincoln   • Number of children: 3   • Years of education: 11   Tobacco Use   • Smoking status: Never   • Smokeless tobacco: Never   Vaping Use   • Vaping Use: Never used   Substance and Sexual Activity   • Alcohol use: Never     Comment: rare   • Drug use: Never       Family History   Problem Relation Age of Onset   • Thyroid disease Father    • Hypertension Father    • Diabetes Father    • Heart disease Father    • Stroke Father    • Hyperlipidemia Father    • Cancer Sister         uteral   • Cancer Brother    • Heart disease Brother    • Hyperlipidemia Brother    • Liver disease Brother        Allergies   Allergen Reactions   • Contrast Dye (Echo Or Unknown Ct/Mr) Unknown - Low Severity   • Latex Unknown - Low Severity   • Tramadol Unknown - Low Severity   • Tuberculin Purified Protein Derivative Unknown - Low Severity   • Morphine Unknown - Low Severity       ROS:   Constitutional:  Admit fatigue, tiredness.    Eyes:  Denies change in visual acuity   HENT:  Denies nasal congestion or sore throat   Respiratory: denies cough, shortness of breath.   Cardiovascular:  denies chest pain, edema   GI:  Denies abdominal pain, nausea, vomiting.   Musculoskeletal:  Denies back pain or joint pain   Integument:  Admit dry skin    Neurologic:  Denies headache, focal weakness or sensory changes   Endocrine:  Denies polyuria or polydipsia   Psychiatric:   Admit depression and anxiety.      Vitals:    03/17/23 1108   BP: 127/80   Pulse: 99   SpO2: 97%     Body mass index is 43.4 kg/m².     Physical Exam:  GEN: NAD, conversant  EYES: EOMI, PERRL, no conjunctival erythema  NECK: no thyromegaly, full ROM   CV: RRR, no murmurs/rubs/gallops, no peripheral edema  LUNG: CTAB, no wheezes/rales/ronchi  SKIN: no rashes, no acanthosis  MSK: no deformities, full ROM of all extremities  NEURO: no tremors, DTR normal  PSYCH: AOX3, appropriate mood, affect normal      Results Review:     I reviewed the patient's new clinical results.      Lab Results   Component Value Date    GLUCOSE 84 08/24/2018    BUN 10 08/24/2018    CREATININE 0.77 08/24/2018    EGFRIFNONA 111 08/24/2018    EGFRIFAFRI 96 08/24/2018    BCR NOT APPLICABLE (calc) 08/24/2018    K 4.5 08/24/2018    CALCIUM 9.4 08/24/2018    ALBUMIN 4.4 08/24/2018    LABIL2 1.7 (calc) 08/24/2018    AST 26 08/24/2018    ALT 31 (H) 08/24/2018    CHOL 201 (H) 08/24/2018    TRIG 459 (H) 08/24/2018    LDL * mg/dL (calc) 08/24/2018    HDL 31 (L) 08/24/2018     Lab Results   Component Value Date    TSH 2.410 10/07/2022    FREET4 1.25 10/07/2022         Medication Review: Reviewed.         Assessment and plan:  Hypothyroidism: She is currently on levothyroxine 75 mcg p.o. daily.  No recent labs, will check TSH and free T4 and then make further recommendations.  For now continue levothyroxine 75 mcg p.o. daily and advised to continue to take it at least 1 hour before or after other pills and food.    Hypertriglyceridemia: Currently on fenofibrate, will follow lipid panel and CMP    Vitamin D deficiency: Currently on vitamin D supplementation.  We will check level.             Kayla Poe MD FACE.

## 2023-03-22 DIAGNOSIS — E03.8 HYPOTHYROIDISM DUE TO HASHIMOTO'S THYROIDITIS: Primary | ICD-10-CM

## 2023-03-22 DIAGNOSIS — E06.3 HYPOTHYROIDISM DUE TO HASHIMOTO'S THYROIDITIS: Primary | ICD-10-CM

## 2023-03-22 RX ORDER — LEVOTHYROXINE SODIUM 88 UG/1
88 TABLET ORAL DAILY
Qty: 30 TABLET | Refills: 2 | Status: SHIPPED | OUTPATIENT
Start: 2023-03-22 | End: 2024-03-21

## 2023-03-29 ENCOUNTER — OFFICE VISIT (OUTPATIENT)
Dept: CARDIOLOGY | Facility: CLINIC | Age: 48
End: 2023-03-29
Payer: COMMERCIAL

## 2023-03-29 VITALS
HEART RATE: 92 BPM | SYSTOLIC BLOOD PRESSURE: 127 MMHG | HEIGHT: 63 IN | DIASTOLIC BLOOD PRESSURE: 89 MMHG | OXYGEN SATURATION: 98 % | BODY MASS INDEX: 43.77 KG/M2 | WEIGHT: 247 LBS

## 2023-03-29 DIAGNOSIS — E06.3 HYPOTHYROIDISM DUE TO HASHIMOTO'S THYROIDITIS: ICD-10-CM

## 2023-03-29 DIAGNOSIS — E03.8 HYPOTHYROIDISM DUE TO HASHIMOTO'S THYROIDITIS: ICD-10-CM

## 2023-03-29 DIAGNOSIS — E78.1 HYPERTRIGLYCERIDEMIA: ICD-10-CM

## 2023-03-29 RX ORDER — AMITRIPTYLINE HYDROCHLORIDE 25 MG/1
25 TABLET, FILM COATED ORAL NIGHTLY
COMMUNITY

## 2023-03-29 RX ORDER — CYCLOSPORINE 0.5 MG/ML
EMULSION OPHTHALMIC
COMMUNITY

## 2023-03-29 NOTE — PROGRESS NOTES
HP      Name: Christie Patel ADMIT: (Not on file)   : 1975  PCP: Rhoda Villa    MRN: [id number] LOS: 0 days   AGE/SEX: 47 y.o. female  ROOM: Room/bed info not found     Chief Complaint   Patient presents with   • Consult     ABN EKG       Subjective        History of present illness  Christie Patel is a 47-year-old female patient who has Hashimoto's thyroiditis and has fluctuating thyroid levels, hypertriglyceridemia, obstructive sleep apnea, working on getting CPAP, is here today for cardiac evaluation mainly prior to starting diet pills.  Patient has been struggling with her weight for many years now despite being on healthy diet, this in part is due to her thyroid issues.  She does have minor chest pains at times but not exertional, denies any significant palpitations no syncopal episodes.    Past Medical History:   Diagnosis Date   • Abnormal ECG 2 weeks ago   • Asthma    • Cancer (HCC)     Cervical   • Diabetes mellitus (HCC)     Pediatric   • Diverticulitis    • Hormone disorder    • Hyperlipidemia    • Hypothyroid    • Sleep apnea      History reviewed. No pertinent surgical history.  Family History   Problem Relation Age of Onset   • Thyroid disease Father    • Hypertension Father    • Diabetes Father    • Heart disease Father    • Stroke Father    • Hyperlipidemia Father    • Cancer Sister         uteral   • Cancer Brother    • Heart disease Brother    • Hyperlipidemia Brother    • Liver disease Brother    • Heart disease Maternal Grandfather    • Hyperlipidemia Maternal Grandfather      Social History     Tobacco Use   • Smoking status: Never     Passive exposure: Never   • Smokeless tobacco: Never   Vaping Use   • Vaping Use: Never used   Substance Use Topics   • Alcohol use: Never     Comment: rare   • Drug use: Never     (Not in a hospital admission)    Allergies:  Contrast dye (echo or unknown ct/mr), Latex, Tramadol, Tuberculin purified protein derivative, Escitalopram, and  Morphine    Review of systems    Constitutional: Negative.    Respiratory and cardiovascular: As detailed in HPI section.  Gastrointestinal: Negative for constipation, nausea and vomiting negative for abdominal distention, abdominal pain and diarrhea.   Genitourinary: Negative for difficulty urinating and flank pain.   Musculoskeletal: Negative for arthralgias, joint swelling and myalgias.   Skin: Negative for color change, rash and wound.   Neurological: Negative for dizziness, syncope, weakness and headaches.   Hematological: Negative for adenopathy.   Psychiatric/Behavioral: Negative for confusion.   All other systems reviewed and are negative.    Physical Exam  VITALS REVIEWED    General:      well developed, in no acute distress.    Head:      normocephalic and atraumatic.    Eyes:      PERRL/EOM intact, conjunctiva and sclera clear with out nystagmus.    Neck:      no masses, thyromegaly,  trachea central with normal respiratory effort and PMI displaced laterally  Lungs:      Clear to auscultation bilaterally  Heart:       Regular rate and rhythm, no murmur  Msk:      no deformity or scoliosis noted of thoracic or lumbar spine.    Pulses:      pulses normal in all 4 extremities.    Extremities:       No lower extremity edema  Neurologic:      no focal deficits.   alert oriented x3  Skin:      intact without lesions or rashes.    Psych:      alert and cooperative; normal mood and affect; normal attention span and concentration.      Result Review :               Pertinent cardiac workup    1. EKG December 12, 2022 normal sinus rhythm, EKG 3/2/2023 sinus rhythm nonspecific ST-T wave changes.      Procedures        Assessment and Plan      Christie Patel is a 47-year-old female patient who is here today for cardiac evaluation.  Patient does not have coronary artery disease or other cardiac problems, both her EKGs from December 2022 in March 2023 were essentially normal.  She does have documented  desaturation under sedation which could be due to her sleep apnea.  She is contemplating starting therapy with phentermine for weight loss.  For that I would like to get an echocardiogram as well as an exercise treadmill stress test and if results are satisfactory then she will be cleared to start this medication.    Diagnoses and all orders for this visit:    1. Body mass index (BMI) of 34.0-34.9 in adult (Primary)    2. Hypothyroidism due to Hashimoto's thyroiditis    3. Hypertriglyceridemia           No follow-ups on file.  Patient was given instructions and counseling regarding her condition or for health maintenance advice. Please see specific information pulled into the AVS if appropriate.

## 2023-04-12 ENCOUNTER — OFFICE VISIT (OUTPATIENT)
Dept: CARDIOLOGY | Facility: CLINIC | Age: 48
End: 2023-04-12
Payer: COMMERCIAL

## 2023-04-12 ENCOUNTER — OUTSIDE FACILITY SERVICE (OUTPATIENT)
Dept: CARDIOLOGY | Facility: CLINIC | Age: 48
End: 2023-04-12
Payer: COMMERCIAL

## 2023-04-12 VITALS
HEART RATE: 98 BPM | SYSTOLIC BLOOD PRESSURE: 114 MMHG | DIASTOLIC BLOOD PRESSURE: 77 MMHG | HEIGHT: 63 IN | OXYGEN SATURATION: 97 % | WEIGHT: 246 LBS | BODY MASS INDEX: 43.59 KG/M2

## 2023-04-12 PROCEDURE — 93018 CV STRESS TEST I&R ONLY: CPT | Performed by: INTERNAL MEDICINE

## 2023-04-12 PROCEDURE — 93306 TTE W/DOPPLER COMPLETE: CPT | Performed by: INTERNAL MEDICINE

## 2023-04-28 ENCOUNTER — LAB (OUTPATIENT)
Dept: LAB | Facility: HOSPITAL | Age: 48
End: 2023-04-28
Payer: COMMERCIAL

## 2023-04-28 DIAGNOSIS — E06.3 HYPOTHYROIDISM DUE TO HASHIMOTO'S THYROIDITIS: ICD-10-CM

## 2023-04-28 DIAGNOSIS — E03.8 HYPOTHYROIDISM DUE TO HASHIMOTO'S THYROIDITIS: ICD-10-CM

## 2023-04-28 LAB
T4 FREE SERPL-MCNC: 1.25 NG/DL (ref 0.93–1.7)
TSH SERPL DL<=0.05 MIU/L-ACNC: 5.61 UIU/ML (ref 0.27–4.2)

## 2023-04-28 PROCEDURE — 36415 COLL VENOUS BLD VENIPUNCTURE: CPT

## 2023-04-28 PROCEDURE — 84439 ASSAY OF FREE THYROXINE: CPT

## 2023-04-28 PROCEDURE — 84443 ASSAY THYROID STIM HORMONE: CPT

## 2023-04-29 NOTE — PROGRESS NOTES
TSH is still high at 5.6, increase levothyroxine to 100 mcg p.o. daily and recheck TSH and free T4 in 6 to 8 weeks.

## 2023-05-08 DIAGNOSIS — E03.8 HYPOTHYROIDISM DUE TO HASHIMOTO'S THYROIDITIS: Primary | ICD-10-CM

## 2023-05-08 DIAGNOSIS — E06.3 HYPOTHYROIDISM DUE TO HASHIMOTO'S THYROIDITIS: Primary | ICD-10-CM

## 2023-05-08 RX ORDER — LEVOTHYROXINE AND LIOTHYRONINE 38; 9 UG/1; UG/1
60 TABLET ORAL
Qty: 90 TABLET | Refills: 3 | Status: SHIPPED | OUTPATIENT
Start: 2023-05-08 | End: 2024-05-07

## 2023-05-08 RX ORDER — LEVOTHYROXINE AND LIOTHYRONINE 38; 9 UG/1; UG/1
60 TABLET ORAL DAILY
Qty: 90 TABLET | Refills: 3 | Status: SHIPPED | OUTPATIENT
Start: 2023-05-08 | End: 2023-05-08

## 2023-05-08 NOTE — TELEPHONE ENCOUNTER
Kayla Poe MD Richardson, Ayana, MA DC levothyroxine and add [place] Thyroid 60 mg p.o. daily.  Check TSH, free T4 and free T3 in 6 to 8 weeks.             Pt notified, verbalized understanding.

## 2023-07-21 ENCOUNTER — LAB (OUTPATIENT)
Dept: LAB | Facility: HOSPITAL | Age: 48
End: 2023-07-21
Payer: COMMERCIAL

## 2023-07-21 DIAGNOSIS — E06.3 HYPOTHYROIDISM DUE TO HASHIMOTO'S THYROIDITIS: ICD-10-CM

## 2023-07-21 DIAGNOSIS — E03.8 HYPOTHYROIDISM DUE TO HASHIMOTO'S THYROIDITIS: ICD-10-CM

## 2023-07-21 LAB
T3FREE SERPL-MCNC: 3.26 PG/ML (ref 2–4.4)
T4 FREE SERPL-MCNC: 0.83 NG/DL (ref 0.93–1.7)
TSH SERPL DL<=0.05 MIU/L-ACNC: 11.36 UIU/ML (ref 0.27–4.2)

## 2023-07-21 PROCEDURE — 84481 FREE ASSAY (FT-3): CPT

## 2023-07-21 PROCEDURE — 84443 ASSAY THYROID STIM HORMONE: CPT

## 2023-07-21 PROCEDURE — 36415 COLL VENOUS BLD VENIPUNCTURE: CPT

## 2023-07-21 PROCEDURE — 84439 ASSAY OF FREE THYROXINE: CPT

## 2023-07-23 NOTE — PROGRESS NOTES
TSH high at the level, increase [place] Thyroid to 90 mg p.o. daily and recheck TSH with free T4 and free T3 in 6 to 8 weeks.

## 2023-07-28 DIAGNOSIS — E03.8 HYPOTHYROIDISM DUE TO HASHIMOTO'S THYROIDITIS: Primary | ICD-10-CM

## 2023-07-28 DIAGNOSIS — E06.3 HYPOTHYROIDISM DUE TO HASHIMOTO'S THYROIDITIS: Primary | ICD-10-CM

## 2023-07-28 DIAGNOSIS — E06.3 HYPOTHYROIDISM DUE TO HASHIMOTO'S THYROIDITIS: ICD-10-CM

## 2023-07-28 DIAGNOSIS — E03.8 HYPOTHYROIDISM DUE TO HASHIMOTO'S THYROIDITIS: ICD-10-CM

## 2023-07-28 RX ORDER — THYROID 90 MG/1
90 TABLET ORAL DAILY
Qty: 90 TABLET | Refills: 2 | Status: SHIPPED | OUTPATIENT
Start: 2023-07-28 | End: 2024-07-27

## 2023-07-28 RX ORDER — THYROID 90 MG/1
90 TABLET ORAL DAILY
Qty: 30 TABLET | Refills: 2 | Status: SHIPPED | OUTPATIENT
Start: 2023-07-28 | End: 2023-07-28 | Stop reason: SDUPTHER

## 2023-07-28 NOTE — TELEPHONE ENCOUNTER
Per [employer] policy, when refills come in if there are any red check marks or if it was a paper/verbal request it has to go to the provider to approve. Forwarding Script to provider.

## 2023-10-06 ENCOUNTER — LAB (OUTPATIENT)
Dept: LAB | Facility: HOSPITAL | Age: 48
End: 2023-10-06
Payer: COMMERCIAL

## 2023-10-06 DIAGNOSIS — E03.8 HYPOTHYROIDISM DUE TO HASHIMOTO'S THYROIDITIS: ICD-10-CM

## 2023-10-06 DIAGNOSIS — E06.3 HYPOTHYROIDISM DUE TO HASHIMOTO'S THYROIDITIS: ICD-10-CM

## 2023-10-06 LAB
T4 FREE SERPL-MCNC: 1.09 NG/DL (ref 0.93–1.7)
TSH SERPL DL<=0.05 MIU/L-ACNC: 7.52 UIU/ML (ref 0.27–4.2)

## 2023-10-06 PROCEDURE — 84439 ASSAY OF FREE THYROXINE: CPT

## 2023-10-06 PROCEDURE — 36415 COLL VENOUS BLD VENIPUNCTURE: CPT

## 2023-10-06 PROCEDURE — 84443 ASSAY THYROID STIM HORMONE: CPT

## 2023-10-09 NOTE — PROGRESS NOTES
TSH improving but is still high.  She is on NP thyroid/[place] Thyroid 90 mg once a day.  Recommend to take 1 extra pill per week of NP thyroid/[place] Thyroid and check TSH and free T4 and free T3 before next follow-up.

## 2023-10-18 DIAGNOSIS — E06.3 HYPOTHYROIDISM DUE TO HASHIMOTO'S THYROIDITIS: ICD-10-CM

## 2023-10-18 DIAGNOSIS — E03.8 HYPOTHYROIDISM DUE TO HASHIMOTO'S THYROIDITIS: ICD-10-CM

## 2023-10-22 RX ORDER — THYROID 90 MG/1
90 TABLET ORAL DAILY
Qty: 102 TABLET | Refills: 2 | Status: SHIPPED | OUTPATIENT
Start: 2023-10-22 | End: 2024-10-21

## 2023-11-27 ENCOUNTER — TELEPHONE (OUTPATIENT)
Dept: ENDOCRINOLOGY | Facility: CLINIC | Age: 48
End: 2023-11-27
Payer: COMMERCIAL

## 2023-11-27 NOTE — TELEPHONE ENCOUNTER
Hub staff attempted to follow warm transfer process and was unsuccessful     Caller: Christie Patel    Relationship to patient: Self    Best call back number: [phone]     Patient is needing:  PT CALLING TO SCHEDULE LAB WORK, THIS FRIDAY IF POSSIBLE.    PT HAS UPCOMING APT 12/8/23. PLEASE CHECK AND ADVISE. PT HAS LUNCH AT 12 AND SAYS THAT WOULD BE BEST TIME TO REACH HER.

## 2023-11-28 NOTE — TELEPHONE ENCOUNTER
Hub staff attempted to follow warm transfer process and was unsuccessful     Caller: Christie Patel    Relationship to patient: Self    Best call back number: [phone]     Patient is needing: PT CALLED BACK AND PT NEEDS THIS APT TO BE AROUND 1PM IF POSSIBLE. PLEASE CHECK AND ADVISE.

## 2023-12-01 ENCOUNTER — LAB (OUTPATIENT)
Dept: LAB | Facility: HOSPITAL | Age: 48
End: 2023-12-01
Payer: COMMERCIAL

## 2023-12-01 DIAGNOSIS — E03.8 HYPOTHYROIDISM DUE TO HASHIMOTO'S THYROIDITIS: ICD-10-CM

## 2023-12-01 DIAGNOSIS — E06.3 HYPOTHYROIDISM DUE TO HASHIMOTO'S THYROIDITIS: ICD-10-CM

## 2023-12-01 LAB
T3FREE SERPL-MCNC: 3.88 PG/ML (ref 2–4.4)
T4 FREE SERPL-MCNC: 1.07 NG/DL (ref 0.93–1.7)
TSH SERPL DL<=0.05 MIU/L-ACNC: 2.66 UIU/ML (ref 0.27–4.2)

## 2023-12-01 PROCEDURE — 84481 FREE ASSAY (FT-3): CPT

## 2023-12-01 PROCEDURE — 36415 COLL VENOUS BLD VENIPUNCTURE: CPT

## 2023-12-01 PROCEDURE — 84439 ASSAY OF FREE THYROXINE: CPT

## 2023-12-01 PROCEDURE — 84443 ASSAY THYROID STIM HORMONE: CPT

## 2023-12-07 RX ORDER — PHENTERMINE HYDROCHLORIDE 37.5 MG/1
1 TABLET ORAL DAILY
COMMUNITY
Start: 2023-11-03

## 2023-12-07 RX ORDER — METFORMIN HYDROCHLORIDE 500 MG/1
1 TABLET, EXTENDED RELEASE ORAL DAILY
COMMUNITY
Start: 2023-11-01

## 2023-12-07 RX ORDER — DULOXETIN HYDROCHLORIDE 30 MG/1
1 CAPSULE, DELAYED RELEASE ORAL 2 TIMES DAILY
COMMUNITY

## 2023-12-07 RX ORDER — DEXTROMETHORPHAN HYDROBROMIDE AND PROMETHAZINE HYDROCHLORIDE 15; 6.25 MG/5ML; MG/5ML
SYRUP ORAL
COMMUNITY
Start: 2023-08-28

## 2023-12-08 ENCOUNTER — OFFICE VISIT (OUTPATIENT)
Dept: ENDOCRINOLOGY | Facility: CLINIC | Age: 48
End: 2023-12-08
Payer: COMMERCIAL

## 2023-12-08 VITALS
HEIGHT: 63 IN | DIASTOLIC BLOOD PRESSURE: 70 MMHG | OXYGEN SATURATION: 97 % | HEART RATE: 102 BPM | WEIGHT: 234 LBS | BODY MASS INDEX: 41.46 KG/M2 | SYSTOLIC BLOOD PRESSURE: 110 MMHG

## 2023-12-08 DIAGNOSIS — E06.3 HYPOTHYROIDISM DUE TO HASHIMOTO'S THYROIDITIS: Primary | ICD-10-CM

## 2023-12-08 DIAGNOSIS — E55.9 VITAMIN D DEFICIENCY: ICD-10-CM

## 2023-12-08 DIAGNOSIS — E78.1 HYPERTRIGLYCERIDEMIA: ICD-10-CM

## 2023-12-08 DIAGNOSIS — E03.8 HYPOTHYROIDISM DUE TO HASHIMOTO'S THYROIDITIS: Primary | ICD-10-CM

## 2023-12-08 PROCEDURE — 99214 OFFICE O/P EST MOD 30 MIN: CPT | Performed by: INTERNAL MEDICINE

## 2023-12-08 NOTE — PROGRESS NOTES
Endocrine Progress Note Outpatient     Patient Care Team:  Rhoda Villa as PCP - General (Nurse Practitioner)  Unique Church MD as Consulting Physician (Cardiology)    Chief Complaint: Follow-up hypothyroidism    HPI: This is a 47-year-old female with history of hypothyroidism, vitamin D deficiency and hypertriglyceridemia is here for follow-up.    For hypothyroidism: She is currently on [place] Thyroid 90 mg once a day with 1 extra pill per week.  Since she changed to [place] Thyroid she feels good and she is not tired.    Hypertriglyceridemia: She is on fenofibrate.    Vitamin D deficiency: She is on vitamin D supplementation.    Past Medical History:   Diagnosis Date    Abnormal ECG 2 weeks ago    Asthma     Cancer     Cervical    Diabetes mellitus     Pediatric    Diverticulitis     Hormone disorder     Hyperlipidemia     Hypothyroid     Sleep apnea        Social History     Socioeconomic History    Marital status:      Spouse name: Lincoln    Number of children: 3    Years of education: 11   Tobacco Use    Smoking status: Never     Passive exposure: Never    Smokeless tobacco: Never   Vaping Use    Vaping Use: Never used   Substance and Sexual Activity    Alcohol use: Never     Comment: rare    Drug use: Never    Sexual activity: Yes     Partners: Male     Comment: Nothing       Family History   Problem Relation Age of Onset    Thyroid disease Father     Hypertension Father     Diabetes Father     Heart disease Father     Stroke Father     Hyperlipidemia Father     Cancer Sister         uteral    Cancer Brother     Heart disease Brother     Hyperlipidemia Brother     Liver disease Brother     Heart disease Maternal Grandfather     Hyperlipidemia Maternal Grandfather        Allergies   Allergen Reactions    Contrast Dye (Echo Or Unknown Ct/Mr) Unknown - Low Severity    Latex Unknown - Low Severity    Tramadol Unknown - Low Severity    Tuberculin Purified Protein Derivative Unknown - Low Severity     Escitalopram Unknown - Low Severity    Iodine Other (See Comments)    Morphine Unknown - Low Severity       ROS:   Constitutional:  Denies fatigue, tiredness.    Eyes:  Denies change in visual acuity   HENT:  Denies nasal congestion or sore throat   Respiratory: denies cough, shortness of breath.   Cardiovascular:  denies chest pain, edema   GI:  Denies abdominal pain, nausea, vomiting.   Musculoskeletal:  Denies back pain or joint pain   Integument:  Denies dry skin   Neurologic:  Denies headache, focal weakness or sensory changes   Endocrine:  Denies polyuria or polydipsia   Psychiatric:   Admit depression and anxiety.      Vitals:    12/08/23 1100   BP: 110/70   Pulse: 102   SpO2: 97%     Body mass index is 41.45 kg/m².     Physical Exam:  GEN: NAD, conversant  EYES: EOMI,   NECK: no thyromegaly,  CV: RRR,  LUNG: CTA  PSYCH: AOX3, appropriate mood, affect normal      Results Review:     I reviewed the patient's new clinical results.      Lab Results   Component Value Date    GLUCOSE 95 03/17/2023    BUN 15 03/17/2023    CREATININE 0.87 03/17/2023    EGFRIFNONA 111 08/24/2018    EGFRIFAFRI 96 08/24/2018    BCR 17.2 03/17/2023    K 4.2 03/17/2023    CO2 21.2 (L) 03/17/2023    CALCIUM 9.9 03/17/2023    ALBUMIN 4.5 03/17/2023    LABIL2 1.7 (calc) 08/24/2018    AST 29 03/17/2023    ALT 43 (H) 03/17/2023    CHOL 165 03/17/2023    TRIG 166 (H) 03/17/2023     03/17/2023    HDL 36 (L) 03/17/2023     Lab Results   Component Value Date    TSH 2.660 12/01/2023    FREET4 1.07 12/01/2023       Medication Review: Reviewed.     Assessment and plan:  Hypothyroidism: Controlled, will continue [place] Thyroid 90 mg p.o. daily with 1 extra pill per week.  TSH, free T4 ferritin normal.  Will follow labs.    Hypertriglyceridemia: She is on fenofibrate, will follow lipid panel and CMP.    Vitamin D deficiency: She is currently taking vitamin D.    Assessment and plan from March 17, 2023 reviewed and updated.       Kayla Poe  MD PETERS.

## 2024-02-04 ENCOUNTER — APPOINTMENT (OUTPATIENT)
Dept: CT IMAGING | Facility: HOSPITAL | Age: 49
End: 2024-02-04
Payer: COMMERCIAL

## 2024-02-04 ENCOUNTER — HOSPITAL ENCOUNTER (EMERGENCY)
Facility: HOSPITAL | Age: 49
Discharge: HOME | End: 2024-02-04
Attending: EMERGENCY MEDICINE | Admitting: EMERGENCY MEDICINE
Payer: COMMERCIAL

## 2024-02-04 VITALS
OXYGEN SATURATION: 100 % | DIASTOLIC BLOOD PRESSURE: 78 MMHG | HEART RATE: 118 BPM | BODY MASS INDEX: 41.25 KG/M2 | WEIGHT: 232.81 LBS | RESPIRATION RATE: 16 BRPM | TEMPERATURE: 98.8 F | SYSTOLIC BLOOD PRESSURE: 134 MMHG | HEIGHT: 63 IN

## 2024-02-04 DIAGNOSIS — J02.0 STREP PHARYNGITIS: Primary | ICD-10-CM

## 2024-02-04 LAB
B PARAPERT DNA SPEC QL NAA+PROBE: NOT DETECTED
B PERT DNA SPEC QL NAA+PROBE: NOT DETECTED
C PNEUM DNA NPH QL NAA+NON-PROBE: NOT DETECTED
FLUAV SUBTYP SPEC NAA+PROBE: NOT DETECTED
FLUBV RNA ISLT QL NAA+PROBE: NOT DETECTED
HADV DNA SPEC NAA+PROBE: NOT DETECTED
HCOV 229E RNA SPEC QL NAA+PROBE: NOT DETECTED
HCOV HKU1 RNA SPEC QL NAA+PROBE: NOT DETECTED
HCOV NL63 RNA SPEC QL NAA+PROBE: NOT DETECTED
HCOV OC43 RNA SPEC QL NAA+PROBE: NOT DETECTED
HMPV RNA NPH QL NAA+NON-PROBE: NOT DETECTED
HPIV1 RNA ISLT QL NAA+PROBE: NOT DETECTED
HPIV2 RNA SPEC QL NAA+PROBE: NOT DETECTED
HPIV3 RNA NPH QL NAA+PROBE: NOT DETECTED
HPIV4 P GENE NPH QL NAA+PROBE: NOT DETECTED
M PNEUMO IGG SER IA-ACNC: NOT DETECTED
RHINOVIRUS RNA SPEC NAA+PROBE: NOT DETECTED
RSV RNA NPH QL NAA+NON-PROBE: NOT DETECTED
S PYO AG THROAT QL: POSITIVE
SARS-COV-2 RNA NPH QL NAA+NON-PROBE: NOT DETECTED

## 2024-02-04 PROCEDURE — 70450 CT HEAD/BRAIN W/O DYE: CPT

## 2024-02-04 PROCEDURE — 87651 STREP A DNA AMP PROBE: CPT | Performed by: EMERGENCY MEDICINE

## 2024-02-04 PROCEDURE — 0202U NFCT DS 22 TRGT SARS-COV-2: CPT | Performed by: EMERGENCY MEDICINE

## 2024-02-04 PROCEDURE — 25010000002 KETOROLAC TROMETHAMINE PER 15 MG: Performed by: EMERGENCY MEDICINE

## 2024-02-04 PROCEDURE — 99284 EMERGENCY DEPT VISIT MOD MDM: CPT

## 2024-02-04 PROCEDURE — 25010000002 DIPHENHYDRAMINE PER 50 MG: Performed by: EMERGENCY MEDICINE

## 2024-02-04 PROCEDURE — 96375 TX/PRO/DX INJ NEW DRUG ADDON: CPT

## 2024-02-04 PROCEDURE — 25010000002 PROCHLORPERAZINE 10 MG/2ML SOLUTION: Performed by: EMERGENCY MEDICINE

## 2024-02-04 PROCEDURE — 96374 THER/PROPH/DIAG INJ IV PUSH: CPT

## 2024-02-04 RX ORDER — KETOROLAC TROMETHAMINE 30 MG/ML
15 INJECTION, SOLUTION INTRAMUSCULAR; INTRAVENOUS ONCE
Status: COMPLETED | OUTPATIENT
Start: 2024-02-04 | End: 2024-02-04

## 2024-02-04 RX ORDER — AMOXICILLIN AND CLAVULANATE POTASSIUM 875; 125 MG/1; MG/1
1 TABLET, FILM COATED ORAL 2 TIMES DAILY
Qty: 20 TABLET | Refills: 0 | Status: SHIPPED | OUTPATIENT
Start: 2024-02-04

## 2024-02-04 RX ORDER — DIPHENHYDRAMINE HYDROCHLORIDE 50 MG/ML
25 INJECTION INTRAMUSCULAR; INTRAVENOUS ONCE
Status: COMPLETED | OUTPATIENT
Start: 2024-02-04 | End: 2024-02-04

## 2024-02-04 RX ORDER — SODIUM CHLORIDE 0.9 % (FLUSH) 0.9 %
10 SYRINGE (ML) INJECTION AS NEEDED
Status: DISCONTINUED | OUTPATIENT
Start: 2024-02-04 | End: 2024-02-04 | Stop reason: HOSPADM

## 2024-02-04 RX ORDER — PROCHLORPERAZINE EDISYLATE 5 MG/ML
10 INJECTION INTRAMUSCULAR; INTRAVENOUS ONCE
Status: COMPLETED | OUTPATIENT
Start: 2024-02-04 | End: 2024-02-04

## 2024-02-04 RX ADMIN — DIPHENHYDRAMINE HYDROCHLORIDE 25 MG: 50 INJECTION, SOLUTION INTRAMUSCULAR; INTRAVENOUS at 12:38

## 2024-02-04 RX ADMIN — PROCHLORPERAZINE EDISYLATE 10 MG: 5 INJECTION INTRAMUSCULAR; INTRAVENOUS at 12:38

## 2024-02-04 RX ADMIN — KETOROLAC TROMETHAMINE 15 MG: 30 INJECTION, SOLUTION INTRAMUSCULAR; INTRAVENOUS at 12:38

## 2024-02-04 NOTE — ED PROVIDER NOTES
Subjective   History of Present Illness  Chief complaint: Headache    48-year-old female presents with a headache.  Patient states symptoms started yesterday.  She reports the feeling of swelling to the left side of her face and down into the left side of her neck.  She has had nausea but no vomiting or diarrhea.  She does report a mild cough and sore throat.  She denies fever but does report some chills.  She denies any numbness, weakness, tingling.    History provided by:  Patient      Review of Systems   Constitutional:  Positive for chills. Negative for fever.   HENT:  Positive for sore throat.    Respiratory:  Positive for cough. Negative for shortness of breath.    Cardiovascular:  Negative for chest pain.   Gastrointestinal:  Positive for nausea. Negative for abdominal pain and vomiting.   Musculoskeletal:  Positive for neck pain. Negative for back pain.   Neurological:  Positive for headaches. Negative for weakness and numbness.   Psychiatric/Behavioral:  Negative for confusion.        Past Medical History:   Diagnosis Date    Abnormal ECG 2 weeks ago    Asthma     Cancer     Cervical    Diabetes mellitus     Pediatric    Diverticulitis     Hormone disorder     Hyperlipidemia     Hypothyroid     Sleep apnea        Allergies   Allergen Reactions    Contrast Dye (Echo Or Unknown Ct/Mr) Unknown - Low Severity    Latex Unknown - Low Severity    Tramadol Unknown - Low Severity    Tuberculin Purified Protein Derivative Unknown - Low Severity    Escitalopram Unknown - Low Severity    Iodine Other (See Comments)    Morphine Unknown - Low Severity       No past surgical history on file.    Family History   Problem Relation Age of Onset    Thyroid disease Father     Hypertension Father     Diabetes Father     Heart disease Father     Stroke Father     Hyperlipidemia Father     Cancer Sister         uteral    Cancer Brother     Heart disease Brother     Hyperlipidemia Brother     Liver disease Brother     Heart disease  "Maternal Grandfather     Hyperlipidemia Maternal Grandfather        Social History     Socioeconomic History    Marital status:      Spouse name: Lincoln    Number of children: 3    Years of education: 11   Tobacco Use    Smoking status: Never     Passive exposure: Never    Smokeless tobacco: Never   Vaping Use    Vaping Use: Never used   Substance and Sexual Activity    Alcohol use: Never     Comment: rare    Drug use: Never    Sexual activity: Yes     Partners: Male     Comment: Nothing       /82   Pulse (!) 128   Temp 98.8 °F (37.1 °C)   Resp 20   Ht 160 cm (63\")   Wt 106 kg (232 lb 12.9 oz)   SpO2 99%   BMI 41.24 kg/m²       Objective   Physical Exam  Vitals and nursing note reviewed.   Constitutional:       Appearance: Normal appearance.   HENT:      Head: Normocephalic and atraumatic.      Left Ear: Tympanic membrane normal.      Mouth/Throat:      Mouth: Mucous membranes are moist.      Pharynx: Posterior oropharyngeal erythema present. No oropharyngeal exudate.   Neck:      Comments: Tenderness to left left lateral aspect of the neck, no posterior neck tenderness  Cardiovascular:      Rate and Rhythm: Normal rate and regular rhythm.      Heart sounds: Normal heart sounds.   Pulmonary:      Effort: Pulmonary effort is normal. No respiratory distress.      Breath sounds: Normal breath sounds.   Abdominal:      Palpations: Abdomen is soft.      Tenderness: There is no abdominal tenderness.   Skin:     General: Skin is warm and dry.   Neurological:      Mental Status: She is alert and oriented to person, place, and time.         Procedures           ED Course      Results for orders placed or performed during the hospital encounter of 02/04/24   Respiratory Panel PCR w/COVID-19(SARS-CoV-2) KADEN/GISELLE/MANUELA/PAD/COR/KHOI In-House, NP Swab in UTM/VTM, 2 HR TAT - Swab, Nasopharynx    Specimen: Nasopharynx; Swab   Result Value Ref Range    ADENOVIRUS, PCR Not Detected Not Detected    Coronavirus 229E Not " Detected Not Detected    Coronavirus HKU1 Not Detected Not Detected    Coronavirus NL63 Not Detected Not Detected    Coronavirus OC43 Not Detected Not Detected    COVID19 Not Detected Not Detected - Ref. Range    Human Metapneumovirus Not Detected Not Detected    Human Rhinovirus/Enterovirus Not Detected Not Detected    Influenza A PCR Not Detected Not Detected    Influenza B PCR Not Detected Not Detected    Parainfluenza Virus 1 Not Detected Not Detected    Parainfluenza Virus 2 Not Detected Not Detected    Parainfluenza Virus 3 Not Detected Not Detected    Parainfluenza Virus 4 Not Detected Not Detected    RSV, PCR Not Detected Not Detected    Bordetella pertussis pcr Not Detected Not Detected    Bordetella parapertussis PCR Not Detected Not Detected    Chlamydophila pneumoniae PCR Not Detected Not Detected    Mycoplasma pneumo by PCR Not Detected Not Detected   Rapid Strep A Screen - Swab, Throat    Specimen: Throat; Swab   Result Value Ref Range    Strep A Ag Positive (A) Negative     CT Head Without Contrast    Result Date: 2/4/2024  1.No evidence for acute intracranial abnormality. Electronically Signed: Aidan Baker MD  2/4/2024 1:24 PM EST  Workstation ID: [id number]                                          Medical Decision Making  Amount and/or Complexity of Data Reviewed  Radiology: ordered.    Risk  Prescription drug management.      Patient had the above evaluation.  Results were discussed with the patient.  CT head showed no acute intracranial abnormality.  Respiratory panel is negative.  Patient did test positive for strep.  Patient remains well-appearing in the emergency room.  She will be discharged with a prescription for Augmentin.  She is to follow-up with her primary doctor.      Final diagnoses:   Strep pharyngitis       ED Disposition  ED Disposition       ED Disposition   Discharge    Condition   Stable    Comment   --               Rhoda Villa  [address] [facility] [place]  [address] [phone]    Call in 2 days           Medication List        New Prescriptions      amoxicillin-clavulanate 875-125 MG per tablet  Commonly known as: AUGMENTIN  Take 1 tablet by mouth 2 (Two) Times a Day.               Where to Get Your Medications        These medications were sent to [facility] [address] [place] [address] [place] - [address] [facility] - [phone]  - [phone] FX  [address] [facility] [place] [address]      Phone: [phone]   amoxicillin-clavulanate 875-125 MG per tablet            Sukhwinder Gagnon MD  02/04/24 0137

## 2024-02-04 NOTE — Clinical Note
[facility] EMERGENCY DEPARTMENT  [address] [place] [address]  Phone: [phone]    Christie Patel was seen and treated in our emergency department on 2/4/2024.  She may return to work on 02/07/2024.         Thank you for choosing [facility].    Sukhwinder Gagnon MD

## 2025-08-19 ENCOUNTER — OFFICE VISIT (OUTPATIENT)
Dept: URBAN - NONMETROPOLITAN AREA CLINIC 6 | Facility: CLINIC | Age: 50
End: 2025-08-19
Payer: COMMERCIAL

## 2025-08-19 DIAGNOSIS — H02.411 MECHANICAL PTOSIS OF RIGHT EYELID: ICD-10-CM

## 2025-08-19 DIAGNOSIS — H04.123 TEAR FILM INSUFFICIENCY OF BILATERAL LACRIMAL GLANDS: ICD-10-CM

## 2025-08-19 DIAGNOSIS — H52.13 MYOPIA, BILATERAL: Primary | ICD-10-CM

## 2025-08-19 PROCEDURE — 92004 COMPRE OPH EXAM NEW PT 1/>: CPT | Performed by: OPTOMETRIST

## 2025-08-19 RX ORDER — CYCLOSPORINE 0.5 MG/ML
0.05 % EMULSION OPHTHALMIC
Qty: 180 | Refills: 3 | Status: ACTIVE
Start: 2025-08-19

## 2025-08-19 ASSESSMENT — KERATOMETRY
OD: 46.62
OS: 46.51

## 2025-08-19 ASSESSMENT — INTRAOCULAR PRESSURE
OS: 20
OD: 20

## 2025-08-19 ASSESSMENT — VISUAL ACUITY
OD: 20/20
OS: 20/20